# Patient Record
Sex: FEMALE | Race: WHITE | Employment: FULL TIME | URBAN - METROPOLITAN AREA
[De-identification: names, ages, dates, MRNs, and addresses within clinical notes are randomized per-mention and may not be internally consistent; named-entity substitution may affect disease eponyms.]

---

## 2021-05-30 ENCOUNTER — RECORDS - HEALTHEAST (OUTPATIENT)
Dept: ADMINISTRATIVE | Facility: CLINIC | Age: 61
End: 2021-05-30

## 2021-06-02 ENCOUNTER — RECORDS - HEALTHEAST (OUTPATIENT)
Dept: ADMINISTRATIVE | Facility: CLINIC | Age: 61
End: 2021-06-02

## 2021-07-21 ENCOUNTER — RECORDS - HEALTHEAST (OUTPATIENT)
Dept: ADMINISTRATIVE | Facility: CLINIC | Age: 61
End: 2021-07-21

## 2021-08-09 ENCOUNTER — DOCUMENTATION ONLY (OUTPATIENT)
Dept: TRANSPLANT | Facility: CLINIC | Age: 61
End: 2021-08-09

## 2021-08-10 ENCOUNTER — TELEPHONE (OUTPATIENT)
Dept: TRANSPLANT | Facility: CLINIC | Age: 61
End: 2021-08-10

## 2021-08-10 NOTE — TELEPHONE ENCOUNTER
Initial Independent Living Donor Advocate contact made with potential donor today.  I introduced myself and my role during the donation process, includin.  ROSEMARY ROLE   The federal government requires that all licensed transplant centers provide the living donor with an Independent Living Donor Advocate (ROSEMARY).  I do not meet recipients or attend meetings that discuss their care or decision to transplant them. My role is separate to avoid any conflict of interest.  My role is to ensure:  1) your rights are protected;  2) you get all the information you need from the transplant team to make a fully informed decision whether to donate;   3) that living donation is in your best interest.   4) that you have the right to decide NOT to go forward with living donation at any time during this process.  I am available to you throughout the workup, during surgery phase and follow-up at home.   2. WORKUP & PRIVACY     Your identity and workup are not shared with the recipient at any time.     There is a medical donor workup that consists of testing to determine if you are healthy enough to donate.  Workup tests include many blood draws, urine collection/ (kidney function testing), chest x-ray, EKG, CT scan. As you complete each step then you may move on to the next.  Workup can take as little or as long as you need and you can stop the process at any time.     Transplant is a treatment option, not a cure. A kidney from a living kidney donor can last 12-14 years.  Other treatment options are  donation and two types of dialysis.     This is major surgery and your estimated hospital stay is approximately 1-2 nights.  After surgery, there are driving and lifting restrictions - no driving for two weeks and no lifting over ten pounds for 6 - 8 weeks.  Donors are routinely off from work for 4 - 6 weeks after surgery, and potentially longer if they have a physical job.       If you anticipate lost wages due to donation,  donor wage reimbursement options may be available to you and will be reviewed with you during the evaluation process.      The recipient's insurance covers the medical expenses related to the donor evaluation and surgery.  However, it is important for you to carry your own health insurance to address any medical issues that are found and are NOT related to living donation.  3.  QUESTIONS  Have you received a packet from the transplant department?     Questions?    Have you discussed with anyone your potential decision to donate?   Yes.  Is anyone pressuring or coercing you to donate? No.  Have you discussed any financial arrangements with recipient around donating a kidney? No.  Are you aware that you can confidentially opt out at any time, up to and including day of donation? Yes.  At this time, would you like to proceed with the medical evaluation to see if you can be a kidney donor?  Yes.    If yes, the donor coordinator will be reaching out to you with next steps.     You can reach me or someone else on the ROSEMARY team by calling [phone] Option 3.    ROSEMARY NOTES: Maura wants to be a donor for a friend.  She is not sure about doing paired exchange. Scheduled with Crystal Egan RN on  Monday, August 16 at 1:00 pm phone, [phone]     Duration of call 20 minutes.

## 2021-08-16 ENCOUNTER — TELEPHONE (OUTPATIENT)
Dept: TRANSPLANT | Facility: CLINIC | Age: 61
End: 2021-08-16

## 2021-08-16 NOTE — TELEPHONE ENCOUNTER
Contacted Maura Sanders to introduce myself and my role, review of medical/surgical/family history and next steps.     Maura Sanders  is aware She can stop donor evaluation at any time.     Maura Sanders is a 61 year old female  ABO unknown that would like to learn more about donation to a friend.     Concerns from medical/surgical/family history: Needs to complete pap, mammo, and colonoscopy (due at 62 per Maura) prior to donation.  Hasn't had them in 5+ years.       Per our Phase 1 algorithm, does meet criteria to do preliminary testing.     Reviewed evaluation testing: Covid PCR, Iohexol, Lab work, CXR, EKG, Provider visits and functions, CT Angiogram.     Reviewed operations of selection committee and angio review meetings and the need for multidisciplinary input.     Reviewed NKR listing and transfer of care to [facility] team if approved. Provided Maura with NKR website to review.     Briefly went over options if approved of NDD, SVP and FVP.     Confirmed that Maura reviewed Informed consent document and all questions answered.  Reviewed that they will receive Docusign to obtain electronic signature for the following: Informed consent, SRTR data, BOBBY for medical information, Auth for Electronic communication and will need their signed consent back before proceeding with evaluation.      Encouraged sign up for MyChart and confirmed [facility] sign up.    Verified recipient status if not NDD.    Donor timeline: TBD     Will send orders to scheduling team to set up for phase 1 testing.

## 2021-08-18 ENCOUNTER — MEDICAL CORRESPONDENCE (OUTPATIENT)
Dept: HEALTH INFORMATION MANAGEMENT | Facility: CLINIC | Age: 61
End: 2021-08-18

## 2021-08-21 ENCOUNTER — HEALTH MAINTENANCE LETTER (OUTPATIENT)
Age: 61
End: 2021-08-21

## 2021-08-31 ENCOUNTER — LAB (OUTPATIENT)
Dept: LAB | Facility: CLINIC | Age: 61
End: 2021-08-31

## 2021-08-31 DIAGNOSIS — Z00.5 PHYSICAL EXAMINATION OF POTENTIAL ORGAN DONOR: Primary | ICD-10-CM

## 2021-08-31 LAB
ABO/RH(D): NORMAL
ALBUMIN UR-MCNC: NEGATIVE MG/DL
APPEARANCE UR: CLEAR
BILIRUB UR QL STRIP: NEGATIVE
COLOR UR AUTO: ABNORMAL
CREAT SERPL-MCNC: 0.88 MG/DL (ref 0.52–1.04)
CREAT UR-MCNC: 11 MG/DL
CREAT UR-MCNC: 11 MG/DL
GFR SERPL CREATININE-BSD FRML MDRD: 72 ML/MIN/1.73M2
GLUCOSE UR STRIP-MCNC: NEGATIVE MG/DL
HGB BLD-MCNC: 13 G/DL (ref 11.7–15.7)
HGB UR QL STRIP: NEGATIVE
KETONES UR STRIP-MCNC: NEGATIVE MG/DL
LEUKOCYTE ESTERASE UR QL STRIP: ABNORMAL
MICROALBUMIN UR-MCNC: <5 MG/L
MICROALBUMIN/CREAT UR: NORMAL MG/G{CREAT}
NITRATE UR QL: NEGATIVE
PH UR STRIP: 7 [PH] (ref 5–7)
PROT UR-MCNC: <0.05 G/L
PROT/CREAT 24H UR: NORMAL MG/G{CREAT}
RBC URINE: <1 /HPF
SP GR UR STRIP: 1 (ref 1–1.03)
SPECIMEN EXPIRATION DATE: NORMAL
SQUAMOUS EPITHELIAL: <1 /HPF
UROBILINOGEN UR STRIP-MCNC: NORMAL MG/DL
WBC URINE: 2 /HPF

## 2021-08-31 PROCEDURE — 36415 COLL VENOUS BLD VENIPUNCTURE: CPT

## 2021-08-31 PROCEDURE — 84156 ASSAY OF PROTEIN URINE: CPT

## 2021-08-31 PROCEDURE — 86900 BLOOD TYPING SEROLOGIC ABO: CPT

## 2021-08-31 PROCEDURE — 81001 URINALYSIS AUTO W/SCOPE: CPT

## 2021-08-31 PROCEDURE — 82565 ASSAY OF CREATININE: CPT

## 2021-08-31 PROCEDURE — 85018 HEMOGLOBIN: CPT

## 2021-08-31 PROCEDURE — 82043 UR ALBUMIN QUANTITATIVE: CPT

## 2021-09-02 ENCOUNTER — DOCUMENTATION ONLY (OUTPATIENT)
Dept: TRANSPLANT | Facility: CLINIC | Age: 61
End: 2021-09-02

## 2021-09-02 ENCOUNTER — TELEPHONE (OUTPATIENT)
Dept: TRANSPLANT | Facility: CLINIC | Age: 61
End: 2021-09-02

## 2021-09-02 DIAGNOSIS — Z00.5 TRANSPLANT DONOR EVALUATION: Primary | ICD-10-CM

## 2021-09-02 RX ORDER — METHYLPREDNISOLONE SODIUM SUCCINATE 125 MG/2ML
125 INJECTION, POWDER, LYOPHILIZED, FOR SOLUTION INTRAMUSCULAR; INTRAVENOUS
Status: CANCELLED
Start: 2021-09-10

## 2021-09-02 RX ORDER — ALBUTEROL SULFATE 90 UG/1
1-2 AEROSOL, METERED RESPIRATORY (INHALATION)
Status: CANCELLED
Start: 2021-09-10

## 2021-09-02 RX ORDER — NALOXONE HYDROCHLORIDE 0.4 MG/ML
0.2 INJECTION, SOLUTION INTRAMUSCULAR; INTRAVENOUS; SUBCUTANEOUS
Status: CANCELLED | OUTPATIENT
Start: 2021-09-10

## 2021-09-02 RX ORDER — EPINEPHRINE 1 MG/ML
0.3 INJECTION, SOLUTION, CONCENTRATE INTRAVENOUS EVERY 5 MIN PRN
Status: CANCELLED | OUTPATIENT
Start: 2021-09-10

## 2021-09-02 RX ORDER — ALBUTEROL SULFATE 0.83 MG/ML
2.5 SOLUTION RESPIRATORY (INHALATION)
Status: CANCELLED | OUTPATIENT
Start: 2021-09-10

## 2021-09-02 RX ORDER — MEPERIDINE HYDROCHLORIDE 25 MG/ML
25 INJECTION INTRAMUSCULAR; INTRAVENOUS; SUBCUTANEOUS EVERY 30 MIN PRN
Status: CANCELLED | OUTPATIENT
Start: 2021-09-10

## 2021-09-02 RX ORDER — DIPHENHYDRAMINE HYDROCHLORIDE 50 MG/ML
50 INJECTION INTRAMUSCULAR; INTRAVENOUS
Status: CANCELLED
Start: 2021-09-10

## 2021-09-02 NOTE — TELEPHONE ENCOUNTER
LM regarding phase 1 results.  Didn't see the 3 BP 15 mins apart and wanted to know if they completed those.      Maura called back at 1pm    Maura Sanders  is aware She can stop donor evaluation at any time.     Maura Sanders is a 61 year old female  ABO A that would like to learn more about donation to a friend.     Concerns from medical/surgical/family history: none    Reviewed any history of travel in endemic areas:   Strongyloides- [place] and [place].  Trypanosoma cruzi ([place])- [place]  West Nile Virus- [place], [place], [place], [place] and [place].     Reviewed preliminary lab tests.     Reviewed evaluation testing: Covid PCR, Iohexol, Lab work, CXR, EKG, Provider visits and functions, CT Angiogram.     Reviewed operations of selection committee and angio review meetings and the need for multidisciplinary input.     Reviewed NKR listing and transfer of care to [facility] team if approved. Provided Maura with NKR website to review.     Briefly went over options if approved of NDD, SVP and FVP.     Maura would like to proceed to evaluation on 9/10/21 if possible with Iohexol on 9/10/21 and COVID PCR prior to Iohexol.     Confirmed that Maura reviewed Informed consent document and all questions answered.  Reviewed that they will receive Docusign to obtain electronic signature for the following: Informed consent, SRTR data, BOBBY for medical information, Auth for Electronic communication and will need their signed consent back before proceeding with evaluation.      Encouraged sign up for MyChart and confirmed [facility] sign up.    Verified recipient status if not NDD.    Donor timeline: TBD     Will send orders to scheduling team to set up for evaluation testing.

## 2021-09-02 NOTE — PROGRESS NOTES
3 BP's didn't get into care everywhere from [facility] (phase 1's) from 8/25/21:    106/64  96/64  108/64

## 2021-09-02 NOTE — TELEPHONE ENCOUNTER
Please sched kidney donor eval on 9/10/21 slot 2.Needs to do Iohexol and eval labs at [facility] lab same day.Will do COVID test at outside lab.Has MyChart.I will do orders.

## 2021-09-03 DIAGNOSIS — Z00.5 TRANSPLANT DONOR EVALUATION: Primary | ICD-10-CM

## 2021-09-07 ENCOUNTER — LAB (OUTPATIENT)
Dept: LAB | Facility: CLINIC | Age: 61
End: 2021-09-07
Payer: COMMERCIAL

## 2021-09-07 DIAGNOSIS — Z00.5 TRANSPLANT DONOR EVALUATION: ICD-10-CM

## 2021-09-07 LAB — SARS-COV-2 RNA RESP QL NAA+PROBE: NEGATIVE

## 2021-09-07 PROCEDURE — U0005 INFEC AGEN DETEC AMPLI PROBE: HCPCS

## 2021-09-07 PROCEDURE — U0003 INFECTIOUS AGENT DETECTION BY NUCLEIC ACID (DNA OR RNA); SEVERE ACUTE RESPIRATORY SYNDROME CORONAVIRUS 2 (SARS-COV-2) (CORONAVIRUS DISEASE [COVID-19]), AMPLIFIED PROBE TECHNIQUE, MAKING USE OF HIGH THROUGHPUT TECHNOLOGIES AS DESCRIBED BY CMS-2020-01-R: HCPCS

## 2021-09-09 LAB
A1 AG RBC QL: POSITIVE
ABO/RH(D): NORMAL
SPECIMEN EXPIRATION DATE: NORMAL
SPECIMEN EXPIRATION DATE: NORMAL

## 2021-09-10 ENCOUNTER — APPOINTMENT (OUTPATIENT)
Dept: TRANSPLANT | Facility: CLINIC | Age: 61
End: 2021-09-10
Attending: SURGERY

## 2021-09-10 ENCOUNTER — OFFICE VISIT (OUTPATIENT)
Dept: NEPHROLOGY | Facility: CLINIC | Age: 61
End: 2021-09-10
Attending: SURGERY

## 2021-09-10 ENCOUNTER — ALLIED HEALTH/NURSE VISIT (OUTPATIENT)
Dept: TRANSPLANT | Facility: CLINIC | Age: 61
End: 2021-09-10
Attending: SURGERY

## 2021-09-10 ENCOUNTER — OFFICE VISIT (OUTPATIENT)
Dept: INFUSION THERAPY | Facility: CLINIC | Age: 61
End: 2021-09-10
Attending: INTERNAL MEDICINE

## 2021-09-10 ENCOUNTER — OFFICE VISIT (OUTPATIENT)
Dept: TRANSPLANT | Facility: CLINIC | Age: 61
End: 2021-09-10
Attending: SURGERY

## 2021-09-10 ENCOUNTER — DOCUMENTATION ONLY (OUTPATIENT)
Dept: TRANSPLANT | Facility: CLINIC | Age: 61
End: 2021-09-10

## 2021-09-10 ENCOUNTER — LAB (OUTPATIENT)
Dept: LAB | Facility: CLINIC | Age: 61
End: 2021-09-10
Attending: INTERNAL MEDICINE

## 2021-09-10 VITALS
OXYGEN SATURATION: 98 % | RESPIRATION RATE: 12 BRPM | SYSTOLIC BLOOD PRESSURE: 120 MMHG | DIASTOLIC BLOOD PRESSURE: 71 MMHG | WEIGHT: 128.4 LBS | HEIGHT: 65 IN | TEMPERATURE: 97.1 F | HEART RATE: 68 BPM | BODY MASS INDEX: 21.39 KG/M2

## 2021-09-10 VITALS
SYSTOLIC BLOOD PRESSURE: 96 MMHG | WEIGHT: 128.31 LBS | BODY MASS INDEX: 21.38 KG/M2 | HEART RATE: 77 BPM | DIASTOLIC BLOOD PRESSURE: 66 MMHG | OXYGEN SATURATION: 100 % | HEIGHT: 65 IN

## 2021-09-10 DIAGNOSIS — Z00.5 TRANSPLANT DONOR EVALUATION: Primary | ICD-10-CM

## 2021-09-10 DIAGNOSIS — E78.2 MIXED HYPERLIPIDEMIA: ICD-10-CM

## 2021-09-10 LAB
ABO/RH(D): NORMAL
ALBUMIN SERPL-MCNC: 3.7 G/DL (ref 3.4–5)
ALBUMIN UR-MCNC: NEGATIVE MG/DL
ALP SERPL-CCNC: 63 U/L (ref 40–150)
ALT SERPL W P-5'-P-CCNC: 25 U/L (ref 0–50)
ANION GAP SERPL CALCULATED.3IONS-SCNC: 8 MMOL/L (ref 3–14)
ANTIBODY SCREEN: NEGATIVE
APPEARANCE UR: CLEAR
APTT PPP: 33 SECONDS (ref 22–38)
AST SERPL W P-5'-P-CCNC: 17 U/L (ref 0–45)
BILIRUB DIRECT SERPL-MCNC: 0.1 MG/DL (ref 0–0.2)
BILIRUB SERPL-MCNC: 0.4 MG/DL (ref 0.2–1.3)
BILIRUB UR QL STRIP: NEGATIVE
BUN SERPL-MCNC: 17 MG/DL (ref 7–30)
CALCIUM SERPL-MCNC: 9 MG/DL (ref 8.5–10.1)
CHLORIDE BLD-SCNC: 108 MMOL/L (ref 94–109)
CHOLEST SERPL-MCNC: 232 MG/DL
CMV IGG SERPL IA-ACNC: >10 U/ML
CMV IGG SERPL IA-ACNC: ABNORMAL
CO2 SERPL-SCNC: 27 MMOL/L (ref 20–32)
COLOR UR AUTO: NORMAL
CREAT SERPL-MCNC: 1.05 MG/DL (ref 0.52–1.04)
CREAT UR-MCNC: 61 MG/DL
CREAT UR-MCNC: 61 MG/DL
EBV VCA IGG SER IA-ACNC: >750 U/ML
EBV VCA IGG SER IA-ACNC: POSITIVE
EBV VCA IGM SER IA-ACNC: <10 U/ML
EBV VCA IGM SER IA-ACNC: NORMAL
ERYTHROCYTE [DISTWIDTH] IN BLOOD BY AUTOMATED COUNT: 13.1 % (ref 10–15)
FASTING STATUS PATIENT QL REPORTED: YES
GFR SERPL CREATININE-BSD FRML MDRD: 58 ML/MIN/1.73M2
GLUCOSE BLD-MCNC: 88 MG/DL (ref 70–99)
GLUCOSE UR STRIP-MCNC: NEGATIVE MG/DL
HBA1C MFR BLD: 5.3 % (ref 0–5.6)
HBV CORE AB SERPL QL IA: NONREACTIVE
HBV SURFACE AB SERPL IA-ACNC: 8.72 M[IU]/ML
HBV SURFACE AG SERPL QL IA: NONREACTIVE
HCT VFR BLD AUTO: 39.9 % (ref 35–47)
HCV AB SERPL QL IA: NONREACTIVE
HDLC SERPL-MCNC: 76 MG/DL
HGB BLD-MCNC: 12.7 G/DL (ref 11.7–15.7)
HGB UR QL STRIP: NEGATIVE
HIV 1+2 AB+HIV1 P24 AG SERPL QL IA: NONREACTIVE
INR PPP: 0.98 (ref 0.85–1.15)
KETONES UR STRIP-MCNC: NEGATIVE MG/DL
LDLC SERPL CALC-MCNC: 144 MG/DL
LEUKOCYTE ESTERASE UR QL STRIP: NEGATIVE
MCH RBC QN AUTO: 30.2 PG (ref 26.5–33)
MCHC RBC AUTO-ENTMCNC: 31.8 G/DL (ref 31.5–36.5)
MCV RBC AUTO: 95 FL (ref 78–100)
MICROALBUMIN UR-MCNC: <5 MG/L
MICROALBUMIN/CREAT UR: NORMAL MG/G{CREAT}
NITRATE UR QL: NEGATIVE
NONHDLC SERPL-MCNC: 156 MG/DL
PH UR STRIP: 6 [PH] (ref 5–7)
PHOSPHATE SERPL-MCNC: 3.9 MG/DL (ref 2.5–4.5)
PLATELET # BLD AUTO: 281 10E3/UL (ref 150–450)
POTASSIUM BLD-SCNC: 4 MMOL/L (ref 3.4–5.3)
PROT SERPL-MCNC: 7.3 G/DL (ref 6.8–8.8)
PROT UR-MCNC: <0.05 G/L
PROT/CREAT 24H UR: NORMAL MG/G{CREAT}
RBC # BLD AUTO: 4.2 10E6/UL (ref 3.8–5.2)
RBC URINE: <1 /HPF
SODIUM SERPL-SCNC: 143 MMOL/L (ref 133–144)
SP GR UR STRIP: 1.01 (ref 1–1.03)
SPECIMEN EXPIRATION DATE: NORMAL
T PALLIDUM AB SER QL: NONREACTIVE
TRIGL SERPL-MCNC: 61 MG/DL
URATE SERPL-MCNC: 4.6 MG/DL (ref 2.6–6)
UROBILINOGEN UR STRIP-MCNC: NORMAL MG/DL
WBC # BLD AUTO: 4.1 10E3/UL (ref 4–11)
WBC URINE: 1 /HPF

## 2021-09-10 PROCEDURE — 250N000011 HC RX IP 250 OP 636: Performed by: INTERNAL MEDICINE

## 2021-09-10 PROCEDURE — 83036 HEMOGLOBIN GLYCOSYLATED A1C: CPT

## 2021-09-10 PROCEDURE — 86644 CMV ANTIBODY: CPT

## 2021-09-10 PROCEDURE — 82542 COL CHROMOTOGRAPHY QUAL/QUAN: CPT | Performed by: SURGERY

## 2021-09-10 PROCEDURE — 99204 OFFICE O/P NEW MOD 45 MIN: CPT | Performed by: SURGERY

## 2021-09-10 PROCEDURE — 87340 HEPATITIS B SURFACE AG IA: CPT

## 2021-09-10 PROCEDURE — 84100 ASSAY OF PHOSPHORUS: CPT

## 2021-09-10 PROCEDURE — 84156 ASSAY OF PROTEIN URINE: CPT

## 2021-09-10 PROCEDURE — 85610 PROTHROMBIN TIME: CPT

## 2021-09-10 PROCEDURE — G0463 HOSPITAL OUTPT CLINIC VISIT: HCPCS

## 2021-09-10 PROCEDURE — 36415 COLL VENOUS BLD VENIPUNCTURE: CPT | Performed by: SURGERY

## 2021-09-10 PROCEDURE — 80053 COMPREHEN METABOLIC PANEL: CPT

## 2021-09-10 PROCEDURE — 36415 COLL VENOUS BLD VENIPUNCTURE: CPT

## 2021-09-10 PROCEDURE — 86901 BLOOD TYPING SEROLOGIC RH(D): CPT

## 2021-09-10 PROCEDURE — 99204 OFFICE O/P NEW MOD 45 MIN: CPT

## 2021-09-10 PROCEDURE — 81001 URINALYSIS AUTO W/SCOPE: CPT

## 2021-09-10 PROCEDURE — 86905 BLOOD TYPING RBC ANTIGENS: CPT

## 2021-09-10 PROCEDURE — 84550 ASSAY OF BLOOD/URIC ACID: CPT

## 2021-09-10 PROCEDURE — 86481 TB AG RESPONSE T-CELL SUSP: CPT

## 2021-09-10 PROCEDURE — 85730 THROMBOPLASTIN TIME PARTIAL: CPT

## 2021-09-10 PROCEDURE — 86803 HEPATITIS C AB TEST: CPT

## 2021-09-10 PROCEDURE — 80061 LIPID PANEL: CPT

## 2021-09-10 PROCEDURE — 86900 BLOOD TYPING SEROLOGIC ABO: CPT

## 2021-09-10 PROCEDURE — 82043 UR ALBUMIN QUANTITATIVE: CPT

## 2021-09-10 PROCEDURE — 86665 EPSTEIN-BARR CAPSID VCA: CPT

## 2021-09-10 PROCEDURE — 86704 HEP B CORE ANTIBODY TOTAL: CPT

## 2021-09-10 PROCEDURE — 82248 BILIRUBIN DIRECT: CPT

## 2021-09-10 PROCEDURE — 86780 TREPONEMA PALLIDUM: CPT

## 2021-09-10 PROCEDURE — 86706 HEP B SURFACE ANTIBODY: CPT

## 2021-09-10 PROCEDURE — 85027 COMPLETE CBC AUTOMATED: CPT

## 2021-09-10 RX ORDER — NALOXONE HYDROCHLORIDE 0.4 MG/ML
0.2 INJECTION, SOLUTION INTRAMUSCULAR; INTRAVENOUS; SUBCUTANEOUS
Status: CANCELLED | OUTPATIENT
Start: 2021-09-10

## 2021-09-10 RX ORDER — METHYLPREDNISOLONE SODIUM SUCCINATE 125 MG/2ML
125 INJECTION, POWDER, LYOPHILIZED, FOR SOLUTION INTRAMUSCULAR; INTRAVENOUS
Status: CANCELLED
Start: 2021-09-10

## 2021-09-10 RX ORDER — EPINEPHRINE 1 MG/ML
0.3 INJECTION, SOLUTION INTRAMUSCULAR; SUBCUTANEOUS EVERY 5 MIN PRN
Status: CANCELLED | OUTPATIENT
Start: 2021-09-10

## 2021-09-10 RX ORDER — MEPERIDINE HYDROCHLORIDE 25 MG/ML
25 INJECTION INTRAMUSCULAR; INTRAVENOUS; SUBCUTANEOUS EVERY 30 MIN PRN
Status: CANCELLED | OUTPATIENT
Start: 2021-09-10

## 2021-09-10 RX ORDER — DIPHENHYDRAMINE HYDROCHLORIDE 50 MG/ML
50 INJECTION INTRAMUSCULAR; INTRAVENOUS
Status: CANCELLED
Start: 2021-09-10

## 2021-09-10 RX ORDER — ALBUTEROL SULFATE 0.83 MG/ML
2.5 SOLUTION RESPIRATORY (INHALATION)
Status: CANCELLED | OUTPATIENT
Start: 2021-09-10

## 2021-09-10 RX ORDER — ALBUTEROL SULFATE 90 UG/1
1-2 AEROSOL, METERED RESPIRATORY (INHALATION)
Status: CANCELLED
Start: 2021-09-10

## 2021-09-10 RX ADMIN — IOHEXOL 5 ML: 300 INJECTION, SOLUTION INTRAVENOUS at 07:58

## 2021-09-10 ASSESSMENT — MIFFLIN-ST. JEOR
SCORE: 1158.3
SCORE: 1157.88

## 2021-09-10 ASSESSMENT — PAIN SCALES - GENERAL: PAINLEVEL: NO PAIN (0)

## 2021-09-10 NOTE — PATIENT INSTRUCTIONS
Thank you for your willingness to donate!     We are awaiting your test to assess your kidney function. Subsequent testing will be guided by the results of this test.   Your coordinator will be reaching out to you once we know more regarding this test.

## 2021-09-10 NOTE — NURSING NOTE
Saw Maura in clinic on 9/10/21 for Living Kidney Donor Evaluation.    Maura is interested in donation for a friend.    I provided a folder which included copies of the following:    Living Kidney Donor Evaluation Consent  Paired Exchange Consent  Donor Shield Pamphlet  Living Donor Collective Study information  Kidney for Life pamphlet  Kidney Donors are Heroes! Study synopsis  SRTR Data from 1/11/21.      I also provided a parking pass.    I reviewed the Living Kidney Donor Evaluation Consent, dated 7-6-2020 and Paired Exchange/ NDD consent dated 9-.  I answered any question.    Evaluation Notes:  1. eGFR was 58 and creat was 1.05.  Kidney function has decreased from her phase 1 labs.  Will continue with iohexol and review results at committee. Per nephrology will cancel CXR, EKG, and CT scan for now until iohexol results come back.  2. Internal tissue typing collected and per Maura she sent in the NKR 3g tissue typing 2-3 week ago.

## 2021-09-10 NOTE — LETTER
9/10/2021       RE: Maura Sanders  [address]  [place] [place] [address]     Dear Colleague,    Thank you for referring your patient, Maura Sanders, to the [facility] [place] at [facility]. Please see a copy of my visit note below.          TRANSPLANT NEPHROLOGY DONOR EVALUATION    Assessment and Plan:  # Prospective Kidney Transplant Donor: Patient with one main issue that needs to be addressed prior to donation.     Within the limitations of both estimated and measured GFR assessments, it remains unclear if she has adequate renal reserve for kidney donation. Accounting for underestimation with her best eGFR on 8/31/2021, she is borderline. We will have to see what her measured GFR shows to determine her candidacy for donation and guide subsequent testing.     She otherwise appears to be an excellent candidate with high motivation, overall great health, adequate support, and excellent insight and judgement.      Patient's blood pressure is acceptable at this visit, kidney function appears to be possibly low with Iohexol pending, and urinalysis is bland.    Discussed the risks of donating a kidney, including the surgical risk and the possible risks of living with one kidney.    Education about expected post-donation kidney function and how chronic kidney disease (CKD) and end stage kidney disease (ESKD) might potentially impact the donor in the future, include, but not limited to:       - On average, donors will have 25-35% permanent loss of kidney function at donation.       - Baseline risk of ESKD may slightly exceed that of members of the general population with the same demographic profile.       - Donor risks must be interpreted in light of known epidemiology of both CKD or ESKD, such as that CKD generally develops in midlife (40-50 years old) and ESKD generally develops after age 60.       - Medical evaluation of young potential  donors cannot predict lifetime risk of CKD or ESKD.       - Donors may be at higher risk for CKD if they sustain damage to the remaining kidney.       - Development of CKD and progression of ESKD may be more rapid with only 1 kidney. I described her risk for ESRD within the limitations of our current understanding using Deyanira et al. Melissa SHERMAN, Jase AB, Yunior MC, Blu RA, Dane MA, Mg JL, Carmen DL. Risk of end-stage renal disease following live kidney donation. KING. 2014 Feb 12;311(6):579-86. doi: 10.1001/king.2013.386013. PMID: 19226587; PMCID: [id number].       - Some type of kidney replacement therapy, either kidney transplant or dialysis, is required when reaching ESKD.    Potential medical or surgical risks include, but not limited to:       - Death.       - Scars, pain, fatigue, and other consequences typical of any surgical procedure.       - Decreased kidney function.       - Abdominal or bowel symptoms, such as bloating and nausea, and developing bowel obstruction.       - Kidney failure (ESKD) and the need for a kidney transplant or dialysis for the donor.       - Impact of obesity, hypertension, or other donor-specific medical conditions on morbidity and mortality of the potential donor.    Patients overall evaluation will be discussed with the transplant team in committee and a final recommendation on the patients' suitability to be a kidney transplant donor will be made at that time.    #Mixed hyperlipidemia on fasting labs   -I advised she discuss with her primary care physician about diet, lifestyle changes and possibly medication as deemed appropriate.    Consult:  Maura Sanders was seen in consultation at the request of Dr. Yared Wilson for evaluation as a potential kidney transplant donor.    Reason for Visit:  Maura Sanders is a 61 year old female who presents for a kidney donor evaluation.  Patient would like to donate to her neighbor, but would be interested in  "non-directed donation/paired exchange were they not a match.     HPI:    Ms. Sanders is a 61 year old female with a history of anemia, vitamin D deficiency, 3 uncomplicated pregnancies with C-sections, appendectomy who is here for a donor evaluation.     Overall Ms. Sanders has been doing well, denying any major health concerns today. She is not on any prescription mediations. She takes a Vitamin D supplement, and iron, magnesium supplements. She uses Aleve \"once every 2 months\" for aches, pains. She is physically fit with a BMI of 21.     She has some familiarity with life after solitary kidney due to her  losing his kidney due to renal cell carcinoma.     On review of systems, she denies fevers, chills, chest pain, shortness of breath, nausea, vomiting, abdominal pain, diarrhea, constipation, edema.     ABO: A negative     Kidney Disease Hx:  eGFR range between 58 and 72 per our labs. Iohexol pending.   UA bland, no overt proteinuria          h/o Kidney Problems: No  Family h/o Genetic Kidney Disease: No       h/o HTN: No    Usual BP: high 90s-100s/60s       h/o Protein in Urine: No  h/o Blood in Urine: No       h/o Kidney Stones: No  h/o Kidney Injury: No       h/o Recurrent UTI: No  h/o Genitourinary Problems: No       h/o Chronic NSAID Use: No    Diabetes  Hx:       h/o DM: No    Cardiovascular Hx:   -Hyperlipidemia with elevated LDL, non-HDL.                h/o Gastrointestinal, Pancreas or Liver Problems: No       h/o Lung or Heart Problems: No       h/o Hematologic Problems: No  h/o Bleeding or Clotting Problems: No       h/o Cancer: No       h/o Infection Problems: No       h/o Gestational DM: No      h/o Preeclampsia: No         Skin Cancer Risk:       h/o more than 50 moles: Yes ; has had a couple spots removed. Pre-cancerous, but denies any cancerous lesions.        h/o extensive sun exposure: Yes        h/o melanoma: No       Family h/o melanoma: No          Cancer Risk:        No personal hx of " cancer        Last Pap: 5 years ago        Last mammogram: 5 years ago        Last colonoscopy: 9 years ago          Mental Health Assessment:       h/o Depression: No       h/o Psychiatric Illness: No       h/o Suicidal Attempt(s): No          COVID Vaccination:        She received Pfizer vaccine 8/2021    Review Of Systems:   A comprehensive review of systems was obtained and negative, except as noted in the HPI or PMH.    Past Medical History:   History was taken from the patient as noted below.  Past Medical History:   Diagnosis Date     Anemia      Vitamin D deficiency        Past Social History:   Past Surgical History:   Procedure Laterality Date     APPENDECTOMY N/A 1974     BUNIONECTOMY N/A 2015     C/SECTION, CLASSICAL N/A 1985     C/SECTION, CLASSICAL  1987     C/SECTION, CLASSICAL N/A 1991     Personal or family history of anesthesia problems: No    Family History:   Family History   Problem Relation Age of Onset     Heart Surgery Father      Cerebrovascular Disease Father      Miguel syndrome Sister      No Known Problems Sister      No Known Problems Brother      No Known Problems Brother      Cerebrovascular Disease Maternal Grandmother      Colon Polyps Maternal Grandfather      Breast Cancer Paternal Grandmother      Pancreatic Cancer Paternal Grandfather      No Known Problems Son      No Known Problems Son      Down Syndrome Daughter           Specific Family History in First Degree Relatives:       FH of Kidney Dz: No FH of DM: No       FH of HTN: No  FH of CAD: No       FH of Cancer: Yes   FH of Kidney Cancer: No    Personal History:   Social History     Socioeconomic History     Marital status:      Spouse name: Not on file     Number of children: Not on file     Years of education: Not on file     Highest education level: Not on file   Occupational History     Not on file   Tobacco Use     Smoking status: Never Smoker     Smokeless tobacco: Never Used   Substance and Sexual Activity      Alcohol use: Not Currently     Drug use: Never     Sexual activity: Not on file   Other Topics Concern     Not on file   Social History Narrative     Not on file     Social Determinants of Health     Financial Resource Strain:      Difficulty of Paying Living Expenses:    Food Insecurity:      Worried About Running Out of Food in the Last Year:      Ran Out of Food in the Last Year:    Transportation Needs:      Lack of Transportation (Medical):      Lack of Transportation (Non-Medical):    Physical Activity:      Days of Exercise per Week:      Minutes of Exercise per Session:    Stress:      Feeling of Stress :    Social Connections:      Frequency of Communication with Friends and Family:      Frequency of Social Gatherings with Friends and Family:      Attends [employer]ats[employer] Services:      Active Member of Clubs or Organizations:      Attends Club or Organization Meetings:      Marital Status:    Intimate Partner Violence:      Fear of Current or Ex-Partner:      Emotionally Abused:      Physically Abused:      Sexually Abused:           Specific Social History:  Active. Biking, tennis, walking, pickleball.   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          Health Insurance Status: Yes       Employment Status: Unemployed  Occupation: Previously worked as physical therapy assistant                     Living Arrangements: lives with their spouse and daughter        Social Support: Yes       Presence of increased risk for disease transmission behaviors as defined by PHS guidelines: No tobacco history. Not current alcohol use.         Allergies:  No Known Allergies    Medications:  No current outpatient medications on file.     No current facility-administered medications for this visit.     There are no discontinued medications.      Vitals:  Vital Signs 9/10/2021 9/10/2021 9/10/2021   Systolic 99 95 96   Diastolic 66 69 66   Pulse - - -   Temperature - - -   Respirations - - -    Weight (LB) - - -   Height - - -   BMI (Calculated) - - -   Pain - - -   O2 - - -       Exam:   GENERAL APPEARANCE: alert and no distress. Appears younger than stated age.   HENT: mouth without ulcers or lesions  LYMPHATICS: no cervical or supraclavicular nodes  RESP: lungs clear to auscultation - no rales, rhonchi or wheezes  CV: regular rhythm, normal rate, no rub, no murmur. 2+ radial and distal pulses.   EDEMA: no LE edema bilaterally  ABDOMEN: soft, nondistended, nontender, bowel sounds normal  MS: extremities normal - no gross deformities noted, no evidence of inflammation in joints, no muscle tenderness  SKIN: no rash    Results:   Labs and imaging were ordered for this visit and reviewed by me.  Recent Results (from the past 336 hour(s))   Albumin Random Urine Quantitative with Creat Ratio    Collection Time: 08/31/21  1:03 PM   Result Value Ref Range    Creatinine Urine mg/dL 11 mg/dL    Albumin Urine mg/L <5 mg/L    Albumin Urine mg/g Cr     Protein  random urine with Creat Ratio    Collection Time: 08/31/21  1:03 PM   Result Value Ref Range    Total Protein Random Urine g/L <0.05 g/L    Total Protein Urine g/gr Creatinine      Creatinine Urine mg/dL 11 mg/dL   UA with Microscopic    Collection Time: 08/31/21  1:03 PM   Result Value Ref Range    Color Urine Straw Colorless, Straw, Light Yellow, Yellow    Appearance Urine Clear Clear    Glucose Urine Negative Negative mg/dL    Bilirubin Urine Negative Negative    Ketones Urine Negative Negative mg/dL    Specific Gravity Urine 1.002 (L) 1.003 - 1.035    Blood Urine Negative Negative    pH Urine 7.0 5.0 - 7.0    Protein Albumin Urine Negative Negative mg/dL    Urobilinogen Urine Normal Normal, 2.0 mg/dL    Nitrite Urine Negative Negative    Leukocyte Esterase Urine Moderate (A) Negative    RBC Urine <1 <=2 /HPF    WBC Urine 2 <=5 /HPF    Squamous Epithelials Urine <1 <=1 /HPF   Creatinine    Collection Time: 08/31/21  1:37 PM   Result Value Ref Range     Creatinine 0.88 0.52 - 1.04 mg/dL    GFR Estimate 72 >60 mL/min/1.73m2   Hemoglobin    Collection Time: 08/31/21  1:37 PM   Result Value Ref Range    Hemoglobin 13.0 11.7 - 15.7 g/dL   ABO and Rh    Collection Time: 08/31/21  1:37 PM   Result Value Ref Range    ABO/RH(D) A NEG     SPECIMEN EXPIRATION DATE [id number]    Asymptomatic COVID-19 Virus (Coronavirus) by PCR Nose    Collection Time: 09/07/21  8:10 AM    Specimen: Nose; Swab   Result Value Ref Range    SARS CoV2 PCR Negative Negative   Routine UA with microscopic    Collection Time: 09/10/21  7:34 AM   Result Value Ref Range    Color Urine Straw Colorless, Straw, Light Yellow, Yellow    Appearance Urine Clear Clear    Glucose Urine Negative Negative mg/dL    Bilirubin Urine Negative Negative    Ketones Urine Negative Negative mg/dL    Specific Gravity Urine 1.011 1.003 - 1.035    Blood Urine Negative Negative    pH Urine 6.0 5.0 - 7.0    Protein Albumin Urine Negative Negative mg/dL    Urobilinogen Urine Normal Normal, 2.0 mg/dL    Nitrite Urine Negative Negative    Leukocyte Esterase Urine Negative Negative    RBC Urine <1 <=2 /HPF    WBC Urine 1 <=5 /HPF   Protein  random urine with Creat Ratio    Collection Time: 09/10/21  7:34 AM   Result Value Ref Range    Total Protein Random Urine g/L <0.05 g/L    Total Protein Urine g/gr Creatinine      Creatinine Urine mg/dL 61 mg/dL   Albumin Random Urine Quantitative with Creat Ratio    Collection Time: 09/10/21  7:34 AM   Result Value Ref Range    Creatinine Urine mg/dL 61 mg/dL    Albumin Urine mg/L <5 mg/L    Albumin Urine mg/g Cr     CBC with platelets    Collection Time: 09/10/21  7:34 AM   Result Value Ref Range    WBC Count 4.1 4.0 - 11.0 10e3/uL    RBC Count 4.20 3.80 - 5.20 10e6/uL    Hemoglobin 12.7 11.7 - 15.7 g/dL    Hematocrit 39.9 35.0 - 47.0 %    MCV 95 78 - 100 fL    MCH 30.2 26.5 - 33.0 pg    MCHC 31.8 31.5 - 36.5 g/dL    RDW 13.1 10.0 - 15.0 %    Platelet Count 281 150 - 450 10e3/uL   Partial  thromboplastin time    Collection Time: 09/10/21  7:34 AM   Result Value Ref Range    aPTT 33 22 - 38 Seconds   INR    Collection Time: 09/10/21  7:34 AM   Result Value Ref Range    INR 0.98 0.85 - 1.15   Hemoglobin A1c    Collection Time: 09/10/21  7:34 AM   Result Value Ref Range    Hemoglobin A1C 5.3 0.0 - 5.6 %   Phosphorus    Collection Time: 09/10/21  7:34 AM   Result Value Ref Range    Phosphorus 3.9 2.5 - 4.5 mg/dL   Uric acid    Collection Time: 09/10/21  7:34 AM   Result Value Ref Range    Uric Acid 4.6 2.6 - 6.0 mg/dL   Lipid Profile    Collection Time: 09/10/21  7:34 AM   Result Value Ref Range    Cholesterol 232 (H) <200 mg/dL    Triglycerides 61 <150 mg/dL    Direct Measure HDL 76 >=50 mg/dL    LDL Cholesterol Calculated 144 (H) <=100 mg/dL    Non HDL Cholesterol 156 (H) <130 mg/dL    Patient Fasting > 8hrs? Yes    Comprehensive metabolic panel    Collection Time: 09/10/21  7:34 AM   Result Value Ref Range    Sodium 143 133 - 144 mmol/L    Potassium 4.0 3.4 - 5.3 mmol/L    Chloride 108 94 - 109 mmol/L    Carbon Dioxide (CO2) 27 20 - 32 mmol/L    Anion Gap 8 3 - 14 mmol/L    Urea Nitrogen 17 7 - 30 mg/dL    Creatinine 1.05 (H) 0.52 - 1.04 mg/dL    Calcium 9.0 8.5 - 10.1 mg/dL    Glucose 88 70 - 99 mg/dL    Alkaline Phosphatase 63 40 - 150 U/L    AST 17 0 - 45 U/L    ALT 25 0 - 50 U/L    Protein Total 7.3 6.8 - 8.8 g/dL    Albumin 3.7 3.4 - 5.0 g/dL    Bilirubin Total 0.4 0.2 - 1.3 mg/dL    GFR Estimate 58 (L) >60 mL/min/1.73m2   Bilirubin direct    Collection Time: 09/10/21  7:34 AM   Result Value Ref Range    Bilirubin Direct 0.1 0.0 - 0.2 mg/dL               Again, thank you for allowing me to participate in the care of your patient.      Sincerely,     Kidney Donor Rodrigue

## 2021-09-10 NOTE — PROGRESS NOTES
Psychosocial Evaluation  Living Organ Donation per OPTN Policy 14.1.A  Organ Type: unrelated, kidney  Presenting Information:  Ms. Lorenzo sper presents to the [facility] to complete a psychosocial evaluation since she is interested in becoming a kidney donor for her next door neighbor, Mr. Lucas Wagner, age 58.    PERSONAL BACKGROUND:  Current Living Situation: Maura lives with her  and 30 year old daughter who has Down's Syndrome in a single family home in [place], [place].    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  She worked in that field for a number of years.  They she cleaned houses for a number of years.  She now cares for her adult daughter and also works in the home.  Her  is retired and works part time at [employer].  He was a technician for [employer].  She denies any financial concerns created to kidney donation.    Health Insurance Status: Ms. Sanders is covered by her 's health care insurance    Family History: Ms. Sanders is .  She and her  have been  for over 35 years.  Their oldest is a son, age 35 who lives with his fiance, another son, 34  lives in [place], [place] with his finance and their 2 girls, ages 15 months and 2 months.  And then Maura has a 30 year old daughter with downs syndrome who lives with them.      General Health: Ms. Sanders reports that she is in excellent health.  She does not have a living will at this time.    Mental Health: Denies any past or present treatment for mental health issues.  Denies any need to see a counselor or therapist.  Denies any past suicidal ideation, plans, or past attempts.  Denies any use of psychotropic medications.  Denies any past history of hospitalization for psychiatric illness.    Alcohol and Drug Use/Abuse/Dependency: Denies any past history of abuse or  dependency on alcohol or illicit drugs. Denies any current use of street drugs, including marijuana.  Denies any past history of negative consequences of use of alcohol or drugs such as a DUI, relationship problems, or problems with work or home life.   Currently, no alcohol use.  Used to drink about 1 per week, typically wine.    Cigarette Use: non smoker    Legal: no legal issues    Coping with major surgery/associated stress: Maura is fairly active.  She loves to read, travel, bike, cook.      Support System: Maura is very supportive and will be her caregiver.  Her sons and daughter are very supportive of her desire to be a donor.    DONOR SPECIFIC INFORMATION:  Relationship to Recipient: Ms. Sanders wants to donate a kidney to her next door neighbor, Mr. Lucas Wagner, age 58,    Decision Process/Motivation to Donate: Maura reports that helping others is a big part of her personal ethics.  She states that she was planning to buy the house next door to them and turn it into a group home for their daughter and a few other people with Down's Syndrome, but that did not work out.  Her neighbor, Lucas, moved in with his family and they have become close.  She wants to help him live a long, better quality of life for him and his family.  She denies feeling any pressure or coercion to be a donor.  She denies any offer of payment to be a kidney donor.    PREPARATION FOR DONATION, RECOVERY, AND POTENTIAL SHORT-LONG-TERM OUTCOMES:  Understanding of the Living Donation Process:   We discussed the role of the donor  and Independent Donor Advocate.  Short and long term medical and psychosocial risks to both, donor and recipient were reviewed and she voices her understanding of these risks.  High risk behaviors as defined by [place] Public Health Services (PHS) that have potential to increase risk of disease transmission were reviewed and she denies any high risk behaviors. Post surgical restrictions (2 weeks no  driving, 6 weeks no lifting over 10 lbs) were reviewed and she appears capable of adhering to the post surgical requirements. The need for a caregiver was discussed and her  will be her caregiver .  The risk of poor psychosocial outcome including problems with body image, post-surgery depression or anxiety, or feelings of emotional distress or bereavement if recipient experiences any recurrent disease, poor outcome or death was reviewed.  Additionally, potential financial implications, including the risk of having difficulty obtaining health care insurance, life insurance, disability insurance, or long term care insurance were reviewed, as were available donor grants to assist with donor related expenses.      We also discussed some unique issues that arise with paired kidney donation, which include the uncertainty of the timing and the importance of having a employment situation and support system that is able to provide sustained support and flexibility.    Ms. Sanders appears capable of understanding this information and making an informed medical decision.    Impressions/Recommendations:   Ms. Maura Sanders  is highly motivated to donate a kidney  to her neighbor, Mr. Lucas Wagner, age 58.  Her decision to donate is free of inducement, coercion, or other undue pressure.   Her housing, finances and employment are stable.  No current/active mental health or chemical abuse issues were identified.  The need for a caregiver was reviewed and she is able to identify a plan to meet her post operative care needs.  She appears capable of making an informed medical decision.  No psychosocial contraindications to living organ donation were identified and  I support Ms. Sanders s desire to donate a kidney to her neighbor, Mr. Lucas Wagner, age 58.         Contact Information:   RONN Lu, [facility]  Clinical  and Independent Donor Advocate  [facility]  Pager:  [phone]  Direct:  [phone]    Time Spent: 60 minutes      Living Kidney Donor Consent per OPTN Policy 14.3.A for Independent Living Donor Advocate (ROSEMARY)    Written assurance has been obtained from the potential donor that he/she:   Is willing to donate  Is free from inducement and coercion  Has been informed that the he/she may decline to donate at any time  Has been informed that transplant centers must:   A) Offer donors an opportunity to discontinue the donor consent or evaluation process in a way that is protected and confidential  B) Provide an independent living donor advocate (ROSEMARY) to assist the potential donor during this process    The following was presented to the potential donor in a language in which the potential donor is able to engage in meaningful dialogue:   Education and instruction about all phases of the living donation process including:   Consent  Medical and psychosocial evaluation  Information about the surgical procedure  Pre and post operative care  Benefits of post operative follow up  Disclosure that the [facility] will take all reasonable precautions to provide confidentiality for the donor/recipient  Disclosure that it is a federal crime for any person to knowingly acquire, obtain or otherwise transfer any human organ for valuable consideration  Disclosure that the [facility] must provide an independent living donor advocate (ROSEMARY)  Disclosure that health information obtained during the evaluation is subject to the same regulations as all records and could reveal conditions that must be reported to local, state, or federal public health authorities  Disclosure that the [facility] is required to report living donor follow up information at 6 months, 1 year, and 2 years, and that the potential donor must commit to post operative follow up testing coordinated by the [facility]    Disclosure has been provided that these risks may be  transient or permanent & include but are not limited to:  Potential psychosocial risks:  Problems with body image  Post-surgery depression or anxiety  Feelings of emotional distress or bereavement if recipient experiences any recurrent disease or in the event of the recipient s death  Impact of donation on the donor s lifestyle    Potential financial impacts:  Personal expenses of travel, housing, , lost wages related to donation might not be reimbursed. However, resources may be available to defray some donation-related expenses   Need for life-long follow up at the donor s expense  Loss of employment or income  Negative impact on the ability to obtain future employment  Negative impact on the ability to obtain, maintain, or afford health, disability, and life insurance  Future health problems experienced by living donors following donation may not be covered by the recipient s insurance    Contact Information:  RONN Lu, [facility]  Clinical  and Independent Donor Advocate  [facility]  Pager:  [phone]  Direct:  [phone]    Time Spent: 60 minutes

## 2021-09-10 NOTE — LETTER
9/10/2021         RE: Maura Sanders  [address] [place] [address]        Dear Colleague,    Thank you for referring your patient, Maura Sanders, to the [facility]. Please see a copy of my visit note below.    Iohexol Timed Test Nursing Note    Maura Sanders comes to [facility] today for a Iohexol GFR Timed test.   Orders from  were completed.    Progress Note      The following information was verified with the patient:  *Female patients are not pregnant or could not have become recently pregnant: No  *Is there a history of allergy (skin rash, swelling, ect) to :   a. Iodine (except skin reactions to Betadine): NO   b. Intravenous radio-contrast agents: NO   c. Seafood: NO     RN provided patient with educational handout regarding timed test. YES    Medication administered :   Iohexol (Omnipaque 300 mg Iodine/ ml concentration) 5 mls.  Site administered right AC  Start [id number]  Stop [id number]    Blood draws were drawn by transplant coordinator.          Patient tolerated the procedure well    Vitals were reviewed   /71   Pulse 68   Temp 97.1  F (36.2  C) (Tympanic)   Resp 12   SpO2 98%     Discharge Plan    Discharge instructions reviewed with patient: YES  Discharge papers printed and given to patient: YES  Patient/Representative verbalized understanding, all questions answered: YES        Fela Lawrence RN,       Again, thank you for allowing me to participate in the care of your patient.        Sincerely,        [facility] [address]

## 2021-09-10 NOTE — PROGRESS NOTES
TRANSPLANT NEPHROLOGY DONOR EVALUATION    Assessment and Plan:  # Prospective Kidney Transplant Donor: Patient with one main issue that needs to be addressed prior to donation.     Within the limitations of both estimated and measured GFR assessments, it remains unclear if she has adequate renal reserve for kidney donation. Accounting for underestimation with her best eGFR on 8/31/2021, she is borderline. We will have to see what her measured GFR shows to determine her candidacy for donation and guide subsequent testing.     She otherwise appears to be an excellent candidate with high motivation, overall great health, adequate support, and excellent insight and judgement.      Patient's blood pressure is acceptable at this visit, kidney function appears to be possibly low with Iohexol pending, and urinalysis is bland.    Discussed the risks of donating a kidney, including the surgical risk and the possible risks of living with one kidney.    Education about expected post-donation kidney function and how chronic kidney disease (CKD) and end stage kidney disease (ESKD) might potentially impact the donor in the future, include, but not limited to:       - On average, donors will have 25-35% permanent loss of kidney function at donation.       - Baseline risk of ESKD may slightly exceed that of members of the general population with the same demographic profile.       - Donor risks must be interpreted in light of known epidemiology of both CKD or ESKD, such as that CKD generally develops in midlife (40-50 years old) and ESKD generally develops after age 60.       - Medical evaluation of young potential donors cannot predict lifetime risk of CKD or ESKD.       - Donors may be at higher risk for CKD if they sustain damage to the remaining kidney.       - Development of CKD and progression of ESKD may be more rapid with only 1 kidney. I described her risk for ESRD within the limitations of our current understanding  using Deyanira et al. Melissa SHERMAN, Jase AB, Yunior MC, Blu RA, Dane MA, Mg JL, Carmen DL. Risk of end-stage renal disease following live kidney donation. KING. 2014 Feb 12;311(6):579-98. doi: 10.1001/king.2013.742017. PMID: 64981365; PMCID: [id number].       - Some type of kidney replacement therapy, either kidney transplant or dialysis, is required when reaching ESKD.    Potential medical or surgical risks include, but not limited to:       - Death.       - Scars, pain, fatigue, and other consequences typical of any surgical procedure.       - Decreased kidney function.       - Abdominal or bowel symptoms, such as bloating and nausea, and developing bowel obstruction.       - Kidney failure (ESKD) and the need for a kidney transplant or dialysis for the donor.       - Impact of obesity, hypertension, or other donor-specific medical conditions on morbidity and mortality of the potential donor.    Patients overall evaluation will be discussed with the transplant team in committee and a final recommendation on the patients' suitability to be a kidney transplant donor will be made at that time.    #Mixed hyperlipidemia on fasting labs   -I advised she discuss with her primary care physician about diet, lifestyle changes and possibly medication as deemed appropriate.    Consult:  Maura Sanders was seen in consultation at the request of Dr. Yared Wilson for evaluation as a potential kidney transplant donor.    Reason for Visit:  Maura Sanders is a 61 year old female who presents for a kidney donor evaluation.  Patient would like to donate to her neighbor, but would be interested in non-directed donation/paired exchange were they not a match.     HPI:    Ms. Sanders is a 61 year old female with a history of anemia, vitamin D deficiency, 3 uncomplicated pregnancies with C-sections, appendectomy who is here for a donor evaluation.     Overall Ms. Sanders has been doing well, denying any major health  "concerns today. She is not on any prescription mediations. She takes a Vitamin D supplement, and iron, magnesium supplements. She uses Aleve \"once every 2 months\" for aches, pains. She is physically fit with a BMI of 21.     She has some familiarity with life after solitary kidney due to her  losing his kidney due to renal cell carcinoma.     On review of systems, she denies fevers, chills, chest pain, shortness of breath, nausea, vomiting, abdominal pain, diarrhea, constipation, edema.     ABO: A negative     Kidney Disease Hx:  eGFR range between 58 and 72 per our labs. Iohexol pending.   UA bland, no overt proteinuria          h/o Kidney Problems: No  Family h/o Genetic Kidney Disease: No       h/o HTN: No    Usual BP: high 90s-100s/60s       h/o Protein in Urine: No  h/o Blood in Urine: No       h/o Kidney Stones: No  h/o Kidney Injury: No       h/o Recurrent UTI: No  h/o Genitourinary Problems: No       h/o Chronic NSAID Use: No    Diabetes  Hx:       h/o DM: No    Cardiovascular Hx:   -Hyperlipidemia with elevated LDL, non-HDL.                h/o Gastrointestinal, Pancreas or Liver Problems: No       h/o Lung or Heart Problems: No       h/o Hematologic Problems: No  h/o Bleeding or Clotting Problems: No       h/o Cancer: No       h/o Infection Problems: No       h/o Gestational DM: No      h/o Preeclampsia: No         Skin Cancer Risk:       h/o more than 50 moles: Yes ; has had a couple spots removed. Pre-cancerous, but denies any cancerous lesions.        h/o extensive sun exposure: Yes        h/o melanoma: No       Family h/o melanoma: No          Cancer Risk:        No personal hx of cancer        Last Pap: 5 years ago        Last mammogram: 5 years ago        Last colonoscopy: 9 years ago          Mental Health Assessment:       h/o Depression: No       h/o Psychiatric Illness: No       h/o Suicidal Attempt(s): No          COVID Vaccination:        She received Pfizer vaccine 8/2021    Review Of " Systems:   A comprehensive review of systems was obtained and negative, except as noted in the HPI or PMH.    Past Medical History:   History was taken from the patient as noted below.  Past Medical History:   Diagnosis Date     Anemia      Vitamin D deficiency        Past Social History:   Past Surgical History:   Procedure Laterality Date     APPENDECTOMY N/A 1974     BUNIONECTOMY N/A 2015     C/SECTION, CLASSICAL N/A 1985     C/SECTION, CLASSICAL  1987     C/SECTION, CLASSICAL N/A 1991     Personal or family history of anesthesia problems: No    Family History:   Family History   Problem Relation Age of Onset     Heart Surgery Father      Cerebrovascular Disease Father      Miguel syndrome Sister      No Known Problems Sister      No Known Problems Brother      No Known Problems Brother      Cerebrovascular Disease Maternal Grandmother      Colon Polyps Maternal Grandfather      Breast Cancer Paternal Grandmother      Pancreatic Cancer Paternal Grandfather      No Known Problems Son      No Known Problems Son      Down Syndrome Daughter           Specific Family History in First Degree Relatives:       FH of Kidney Dz: No FH of DM: No       FH of HTN: No  FH of CAD: No       FH of Cancer: Yes   FH of Kidney Cancer: No    Personal History:   Social History     Socioeconomic History     Marital status:      Spouse name: Not on file     Number of children: Not on file     Years of education: Not on file     Highest education level: Not on file   Occupational History     Not on file   Tobacco Use     Smoking status: Never Smoker     Smokeless tobacco: Never Used   Substance and Sexual Activity     Alcohol use: Not Currently     Drug use: Never     Sexual activity: Not on file   Other Topics Concern     Not on file   Social History Narrative     Not on file     Social Determinants of Health     Financial Resource Strain:      Difficulty of Paying Living Expenses:    Food Insecurity:      Worried About Running  Out of Food in the Last Year:      Ran Out of Food in the Last Year:    Transportation Needs:      Lack of Transportation (Medical):      Lack of Transportation (Non-Medical):    Physical Activity:      Days of Exercise per Week:      Minutes of Exercise per Session:    Stress:      Feeling of Stress :    Social Connections:      Frequency of Communication with Friends and Family:      Frequency of Social Gatherings with Friends and Family:      Attends Jain Services:      Active Member of Clubs or Organizations:      Attends Club or Organization Meetings:      Marital Status:    Intimate Partner Violence:      Fear of Current or Ex-Partner:      Emotionally Abused:      Physically Abused:      Sexually Abused:           Specific Social History:  Active. Biking, tennis, walking, pickleball.   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          Health Insurance Status: Yes       Employment Status: Unemployed  Occupation: Previously worked as physical therapy assistant                     Living Arrangements: lives with their spouse and daughter        Social Support: Yes       Presence of increased risk for disease transmission behaviors as defined by PHS guidelines: No tobacco history. Not current alcohol use.         Allergies:  No Known Allergies    Medications:  No current outpatient medications on file.     No current facility-administered medications for this visit.     There are no discontinued medications.      Vitals:  Vital Signs 9/10/2021 9/10/2021 9/10/2021   Systolic 99 95 96   Diastolic 66 69 66   Pulse - - -   Temperature - - -   Respirations - - -   Weight (LB) - - -   Height - - -   BMI (Calculated) - - -   Pain - - -   O2 - - -       Exam:   GENERAL APPEARANCE: alert and no distress. Appears younger than stated age.   HENT: mouth without ulcers or lesions  LYMPHATICS: no cervical or supraclavicular nodes  RESP: lungs clear to auscultation - no rales, rhonchi or  wheezes  CV: regular rhythm, normal rate, no rub, no murmur. 2+ radial and distal pulses.   EDEMA: no LE edema bilaterally  ABDOMEN: soft, nondistended, nontender, bowel sounds normal  MS: extremities normal - no gross deformities noted, no evidence of inflammation in joints, no muscle tenderness  SKIN: no rash    Results:   Labs and imaging were ordered for this visit and reviewed by me.  Recent Results (from the past 336 hour(s))   Albumin Random Urine Quantitative with Creat Ratio    Collection Time: 08/31/21  1:03 PM   Result Value Ref Range    Creatinine Urine mg/dL 11 mg/dL    Albumin Urine mg/L <5 mg/L    Albumin Urine mg/g Cr     Protein  random urine with Creat Ratio    Collection Time: 08/31/21  1:03 PM   Result Value Ref Range    Total Protein Random Urine g/L <0.05 g/L    Total Protein Urine g/gr Creatinine      Creatinine Urine mg/dL 11 mg/dL   UA with Microscopic    Collection Time: 08/31/21  1:03 PM   Result Value Ref Range    Color Urine Straw Colorless, Straw, Light Yellow, Yellow    Appearance Urine Clear Clear    Glucose Urine Negative Negative mg/dL    Bilirubin Urine Negative Negative    Ketones Urine Negative Negative mg/dL    Specific Gravity Urine 1.002 (L) 1.003 - 1.035    Blood Urine Negative Negative    pH Urine 7.0 5.0 - 7.0    Protein Albumin Urine Negative Negative mg/dL    Urobilinogen Urine Normal Normal, 2.0 mg/dL    Nitrite Urine Negative Negative    Leukocyte Esterase Urine Moderate (A) Negative    RBC Urine <1 <=2 /HPF    WBC Urine 2 <=5 /HPF    Squamous Epithelials Urine <1 <=1 /HPF   Creatinine    Collection Time: 08/31/21  1:37 PM   Result Value Ref Range    Creatinine 0.88 0.52 - 1.04 mg/dL    GFR Estimate 72 >60 mL/min/1.73m2   Hemoglobin    Collection Time: 08/31/21  1:37 PM   Result Value Ref Range    Hemoglobin 13.0 11.7 - 15.7 g/dL   ABO and Rh    Collection Time: 08/31/21  1:37 PM   Result Value Ref Range    ABO/RH(D) A NEG     SPECIMEN EXPIRATION DATE [id number]     Asymptomatic COVID-19 Virus (Coronavirus) by PCR Nose    Collection Time: 09/07/21  8:10 AM    Specimen: Nose; Swab   Result Value Ref Range    SARS CoV2 PCR Negative Negative   Routine UA with microscopic    Collection Time: 09/10/21  7:34 AM   Result Value Ref Range    Color Urine Straw Colorless, Straw, Light Yellow, Yellow    Appearance Urine Clear Clear    Glucose Urine Negative Negative mg/dL    Bilirubin Urine Negative Negative    Ketones Urine Negative Negative mg/dL    Specific Gravity Urine 1.011 1.003 - 1.035    Blood Urine Negative Negative    pH Urine 6.0 5.0 - 7.0    Protein Albumin Urine Negative Negative mg/dL    Urobilinogen Urine Normal Normal, 2.0 mg/dL    Nitrite Urine Negative Negative    Leukocyte Esterase Urine Negative Negative    RBC Urine <1 <=2 /HPF    WBC Urine 1 <=5 /HPF   Protein  random urine with Creat Ratio    Collection Time: 09/10/21  7:34 AM   Result Value Ref Range    Total Protein Random Urine g/L <0.05 g/L    Total Protein Urine g/gr Creatinine      Creatinine Urine mg/dL 61 mg/dL   Albumin Random Urine Quantitative with Creat Ratio    Collection Time: 09/10/21  7:34 AM   Result Value Ref Range    Creatinine Urine mg/dL 61 mg/dL    Albumin Urine mg/L <5 mg/L    Albumin Urine mg/g Cr     CBC with platelets    Collection Time: 09/10/21  7:34 AM   Result Value Ref Range    WBC Count 4.1 4.0 - 11.0 10e3/uL    RBC Count 4.20 3.80 - 5.20 10e6/uL    Hemoglobin 12.7 11.7 - 15.7 g/dL    Hematocrit 39.9 35.0 - 47.0 %    MCV 95 78 - 100 fL    MCH 30.2 26.5 - 33.0 pg    MCHC 31.8 31.5 - 36.5 g/dL    RDW 13.1 10.0 - 15.0 %    Platelet Count 281 150 - 450 10e3/uL   Partial thromboplastin time    Collection Time: 09/10/21  7:34 AM   Result Value Ref Range    aPTT 33 22 - 38 Seconds   INR    Collection Time: 09/10/21  7:34 AM   Result Value Ref Range    INR 0.98 0.85 - 1.15   Hemoglobin A1c    Collection Time: 09/10/21  7:34 AM   Result Value Ref Range    Hemoglobin A1C 5.3 0.0 - 5.6 %    Phosphorus    Collection Time: 09/10/21  7:34 AM   Result Value Ref Range    Phosphorus 3.9 2.5 - 4.5 mg/dL   Uric acid    Collection Time: 09/10/21  7:34 AM   Result Value Ref Range    Uric Acid 4.6 2.6 - 6.0 mg/dL   Lipid Profile    Collection Time: 09/10/21  7:34 AM   Result Value Ref Range    Cholesterol 232 (H) <200 mg/dL    Triglycerides 61 <150 mg/dL    Direct Measure HDL 76 >=50 mg/dL    LDL Cholesterol Calculated 144 (H) <=100 mg/dL    Non HDL Cholesterol 156 (H) <130 mg/dL    Patient Fasting > 8hrs? Yes    Comprehensive metabolic panel    Collection Time: 09/10/21  7:34 AM   Result Value Ref Range    Sodium 143 133 - 144 mmol/L    Potassium 4.0 3.4 - 5.3 mmol/L    Chloride 108 94 - 109 mmol/L    Carbon Dioxide (CO2) 27 20 - 32 mmol/L    Anion Gap 8 3 - 14 mmol/L    Urea Nitrogen 17 7 - 30 mg/dL    Creatinine 1.05 (H) 0.52 - 1.04 mg/dL    Calcium 9.0 8.5 - 10.1 mg/dL    Glucose 88 70 - 99 mg/dL    Alkaline Phosphatase 63 40 - 150 U/L    AST 17 0 - 45 U/L    ALT 25 0 - 50 U/L    Protein Total 7.3 6.8 - 8.8 g/dL    Albumin 3.7 3.4 - 5.0 g/dL    Bilirubin Total 0.4 0.2 - 1.3 mg/dL    GFR Estimate 58 (L) >60 mL/min/1.73m2   Bilirubin direct    Collection Time: 09/10/21  7:34 AM   Result Value Ref Range    Bilirubin Direct 0.1 0.0 - 0.2 mg/dL

## 2021-09-10 NOTE — LETTER
9/10/2021         RE: Maura Sanders  [address] [place] [address]        Dear Colleague,    Thank you for referring your patient, Maura Sanders, to the [facility]. Please see a copy of my visit note below.    Transplant Surgery Consult Note    Medical record number: [id number]  YOB: 1960,   Consult requested by the patient for evaluation of kidney donation candidacy.    Assessment and Recommendations: Ms. Sanders appears to be a excellent candidate for kidney donation at this point in the evaluation. The following issues will need to be addressed prior to formal review:  62 yo healthy female    Donating to friend  H/O appendectomy open  3 C sections  Physically active   Blood type A  Is willing to consider paired donation if necessary    Risks of the surgical procedure including but not limited to the rare risk of mortality discussed in detail. Patient verbalized good understanding and had several pertinent questions which were answered satisfactorily.         The majority of our visit today was spent in counseling regarding the medical and surgical risks of kidney donation, the typical santos-and post-operative experience and recovery/return to work pattern.  We also talked about post-op visits and longer term health care maintenance, as well as the implications of having one remaining kidney. This discussion included, but was not limited to rates of complications such as bleeding, infection, need for transfusion, reoperation, other organ injury, future bowel obstruction, incisional hernia, port site pain, varicocele, venous thrombosis, pulmonary embolism, renal failure, and death (3 per 10,000). The patient understands that if end stage renal failure happens that dialysis or transplant would be required.   At the conclusion of the visit, all questions had been answered and Ms. Sanders's candidacy for donation will be reviewed at our Multidisciplinary Donor  Selection Committee. She will stay in contact with the nurse coordinator with any concerns.      Total time: 45 minutes  Counselling time: 30 minutes    .  ---------------------------------------------------------------------------------------------------    HPI: Maura Sanders is a 61 year old year old female who presents for a kidney donor evaluation.  Patient would like to donate to friend.      Personal history of:   No    Yes  Cancer:    []      []             Comment:     Diabetes   []      []  Comment:    Warren   []      []  Comment:       Hepatitis   []      []  Comment:    Tuberculosis   []      []  Comment:   Back or neck pain:  []      []  Comment:      Kidney stones   []      []  Comment:                  Kidney infections  []      []  Comment:           Urinary retention  []      []            Comment:   Regular NSAID use:  []      []            Comment:      Constipation:   []      []            Comment:      Sikhism  []      []            Comment:      Other:    []      []            Comment:         Past Medical History:   Diagnosis Date     Anemia      Vitamin D deficiency      Past Surgical History:   Procedure Laterality Date     APPENDECTOMY N/A 1974     BUNIONECTOMY N/A 2015     C/SECTION, CLASSICAL N/A 1985     C/SECTION, CLASSICAL  1987     C/SECTION, CLASSICAL N/A 1991     Family History   Problem Relation Age of Onset     Heart Surgery Father      Cerebrovascular Disease Father      Miguel syndrome Sister      No Known Problems Sister      No Known Problems Brother      No Known Problems Brother      Cerebrovascular Disease Maternal Grandmother      Colon Polyps Maternal Grandfather      Breast Cancer Paternal Grandmother      Pancreatic Cancer Paternal Grandfather      No Known Problems Son      No Known Problems Son      Down Syndrome Daughter      Social History     Socioeconomic History     Marital status:      Spouse name: Not on file     Number of children: Not on  file     Years of education: Not on file     Highest education level: Not on file   Occupational History     Not on file   Tobacco Use     Smoking status: Never Smoker     Smokeless tobacco: Never Used   Substance and Sexual Activity     Alcohol use: Not Currently     Drug use: Never     Sexual activity: Not on file   Other Topics Concern     Not on file   Social History Narrative     Not on file     Social Determinants of Health     Financial Resource Strain:      Difficulty of Paying Living Expenses:    Food Insecurity:      Worried About Running Out of Food in the Last Year:      Ran Out of Food in the Last Year:    Transportation Needs:      Lack of Transportation (Medical):      Lack of Transportation (Non-Medical):    Physical Activity:      Days of Exercise per Week:      Minutes of Exercise per Session:    Stress:      Feeling of Stress :    Social Connections:      Frequency of Communication with Friends and Family:      Frequency of Social Gatherings with Friends and Family:      Attends Latter-day Services:      Active Member of Clubs or Organizations:      Attends Club or Organization Meetings:      Marital Status:    Intimate Partner Violence:      Fear of Current or Ex-Partner:      Emotionally Abused:      Physically Abused:      Sexually Abused:        ROS:   CONSTITUTIONAL:  No fevers or chills  EYES: negative for icterus  ENT:  negative for hearing loss, tinnitus and sore throat  RESPIRATORY:  negative for cough, sputum, dyspnea  CARDIOVASCULAR:  negative for chest pain  GASTROINTESTINAL:  negative for nausea, vomiting, diarrhea or constipation  GENITOURINARY:  negative for incontinence, dysuria, bladder emptying problems  HEME:  No easy bruising  INTEGUMENT:  negative for rash and pruritus  NEURO:  Negative for headache, seizure disorder    Allergies:   No Known Allergies    Medications:  Clinic-Administered Medications as of 9/10/2021       Dose Frequency Start End    iohexol (OMNIPAQUE 300)  injection 5 mL (Completed) 5 mL ONCE 9/10/2021 9/10/2021    Route: Intravenous          Exam:   Temp:  [97.1  F (36.2  C)] 97.1  F (36.2  C)  Pulse:  [68-77] 77  Resp:  [12] 12  BP: ()/(66-71) 96/66  SpO2:  [98 %-100 %] 100 %  Body mass index is 21.35 kg/m .  Temp:  [97.1  F (36.2  C)] 97.1  F (36.2  C)  Pulse:  [68-77] 77  Resp:  [12] 12  BP: ()/(66-71) 96/66  SpO2:  [98 %-100 %] 100 %  Appearance: in no apparent distress.   Skin: normal  Head and Neck: Normal, no rashes or jaundice  Respiratory: normal respiratory excursions, no audible wheeze  Cardiovascular: RRR  Abdomen: flat, Surgical scars consistent with history   Extremeties: Edema, none  Neuro: without deficit       Diagnostics:   Recent Results (from the past 336 hour(s))   Albumin Random Urine Quantitative with Creat Ratio    Collection Time: 08/31/21  1:03 PM   Result Value Ref Range    Creatinine Urine mg/dL 11 mg/dL    Albumin Urine mg/L <5 mg/L    Albumin Urine mg/g Cr     Protein  random urine with Creat Ratio    Collection Time: 08/31/21  1:03 PM   Result Value Ref Range    Total Protein Random Urine g/L <0.05 g/L    Total Protein Urine g/gr Creatinine      Creatinine Urine mg/dL 11 mg/dL   UA with Microscopic    Collection Time: 08/31/21  1:03 PM   Result Value Ref Range    Color Urine Straw Colorless, Straw, Light Yellow, Yellow    Appearance Urine Clear Clear    Glucose Urine Negative Negative mg/dL    Bilirubin Urine Negative Negative    Ketones Urine Negative Negative mg/dL    Specific Gravity Urine 1.002 (L) 1.003 - 1.035    Blood Urine Negative Negative    pH Urine 7.0 5.0 - 7.0    Protein Albumin Urine Negative Negative mg/dL    Urobilinogen Urine Normal Normal, 2.0 mg/dL    Nitrite Urine Negative Negative    Leukocyte Esterase Urine Moderate (A) Negative    RBC Urine <1 <=2 /HPF    WBC Urine 2 <=5 /HPF    Squamous Epithelials Urine <1 <=1 /HPF   Creatinine    Collection Time: 08/31/21  1:37 PM   Result Value Ref Range     Creatinine 0.88 0.52 - 1.04 mg/dL    GFR Estimate 72 >60 mL/min/1.73m2   Hemoglobin    Collection Time: 08/31/21  1:37 PM   Result Value Ref Range    Hemoglobin 13.0 11.7 - 15.7 g/dL   ABO and Rh    Collection Time: 08/31/21  1:37 PM   Result Value Ref Range    ABO/RH(D) A NEG     SPECIMEN EXPIRATION DATE [id number]    Asymptomatic COVID-19 Virus (Coronavirus) by PCR Nose    Collection Time: 09/07/21  8:10 AM    Specimen: Nose; Swab   Result Value Ref Range    SARS CoV2 PCR Negative Negative   Routine UA with microscopic    Collection Time: 09/10/21  7:34 AM   Result Value Ref Range    Color Urine Straw Colorless, Straw, Light Yellow, Yellow    Appearance Urine Clear Clear    Glucose Urine Negative Negative mg/dL    Bilirubin Urine Negative Negative    Ketones Urine Negative Negative mg/dL    Specific Gravity Urine 1.011 1.003 - 1.035    Blood Urine Negative Negative    pH Urine 6.0 5.0 - 7.0    Protein Albumin Urine Negative Negative mg/dL    Urobilinogen Urine Normal Normal, 2.0 mg/dL    Nitrite Urine Negative Negative    Leukocyte Esterase Urine Negative Negative    RBC Urine <1 <=2 /HPF    WBC Urine 1 <=5 /HPF   Protein  random urine with Creat Ratio    Collection Time: 09/10/21  7:34 AM   Result Value Ref Range    Total Protein Random Urine g/L <0.05 g/L    Total Protein Urine g/gr Creatinine      Creatinine Urine mg/dL 61 mg/dL   Albumin Random Urine Quantitative with Creat Ratio    Collection Time: 09/10/21  7:34 AM   Result Value Ref Range    Creatinine Urine mg/dL 61 mg/dL    Albumin Urine mg/L <5 mg/L    Albumin Urine mg/g Cr     CBC with platelets    Collection Time: 09/10/21  7:34 AM   Result Value Ref Range    WBC Count 4.1 4.0 - 11.0 10e3/uL    RBC Count 4.20 3.80 - 5.20 10e6/uL    Hemoglobin 12.7 11.7 - 15.7 g/dL    Hematocrit 39.9 35.0 - 47.0 %    MCV 95 78 - 100 fL    MCH 30.2 26.5 - 33.0 pg    MCHC 31.8 31.5 - 36.5 g/dL    RDW 13.1 10.0 - 15.0 %    Platelet Count 281 150 - 450 10e3/uL   Partial  thromboplastin time    Collection Time: 09/10/21  7:34 AM   Result Value Ref Range    aPTT 33 22 - 38 Seconds   INR    Collection Time: 09/10/21  7:34 AM   Result Value Ref Range    INR 0.98 0.85 - 1.15   Hemoglobin A1c    Collection Time: 09/10/21  7:34 AM   Result Value Ref Range    Hemoglobin A1C 5.3 0.0 - 5.6 %   Phosphorus    Collection Time: 09/10/21  7:34 AM   Result Value Ref Range    Phosphorus 3.9 2.5 - 4.5 mg/dL   Uric acid    Collection Time: 09/10/21  7:34 AM   Result Value Ref Range    Uric Acid 4.6 2.6 - 6.0 mg/dL   Lipid Profile    Collection Time: 09/10/21  7:34 AM   Result Value Ref Range    Cholesterol 232 (H) <200 mg/dL    Triglycerides 61 <150 mg/dL    Direct Measure HDL 76 >=50 mg/dL    LDL Cholesterol Calculated 144 (H) <=100 mg/dL    Non HDL Cholesterol 156 (H) <130 mg/dL    Patient Fasting > 8hrs? Yes    Comprehensive metabolic panel    Collection Time: 09/10/21  7:34 AM   Result Value Ref Range    Sodium 143 133 - 144 mmol/L    Potassium 4.0 3.4 - 5.3 mmol/L    Chloride 108 94 - 109 mmol/L    Carbon Dioxide (CO2) 27 20 - 32 mmol/L    Anion Gap 8 3 - 14 mmol/L    Urea Nitrogen 17 7 - 30 mg/dL    Creatinine 1.05 (H) 0.52 - 1.04 mg/dL    Calcium 9.0 8.5 - 10.1 mg/dL    Glucose 88 70 - 99 mg/dL    Alkaline Phosphatase 63 40 - 150 U/L    AST 17 0 - 45 U/L    ALT 25 0 - 50 U/L    Protein Total 7.3 6.8 - 8.8 g/dL    Albumin 3.7 3.4 - 5.0 g/dL    Bilirubin Total 0.4 0.2 - 1.3 mg/dL    GFR Estimate 58 (L) >60 mL/min/1.73m2   Bilirubin direct    Collection Time: 09/10/21  7:34 AM   Result Value Ref Range    Bilirubin Direct 0.1 0.0 - 0.2 mg/dL           Again, thank you for allowing me to participate in the care of your patient.        Sincerely,        Yared Wilson MD

## 2021-09-10 NOTE — PROGRESS NOTES
Iohexol Timed Test Nursing Note    Maura Sanders comes to [facility] today for a Iohexol GFR Timed test.   Orders from  were completed.    Progress Note      The following information was verified with the patient:  *Female patients are not pregnant or could not have become recently pregnant: No  *Is there a history of allergy (skin rash, swelling, ect) to :   a. Iodine (except skin reactions to Betadine): NO   b. Intravenous radio-contrast agents: NO   c. Seafood: NO     RN provided patient with educational handout regarding timed test. YES    Medication administered :   Iohexol (Omnipaque 300 mg Iodine/ ml concentration) 5 mls.  Site administered right AC  Start [id number]  Stop [id number]    Blood draws were drawn by transplant coordinator.          Patient tolerated the procedure well    Vitals were reviewed   /71   Pulse 68   Temp 97.1  F (36.2  C) (Tympanic)   Resp 12   SpO2 98%     Discharge Plan    Discharge instructions reviewed with patient: YES  Discharge papers printed and given to patient: YES  Patient/Representative verbalized understanding, all questions answered: YES        Fela Lawrence RN,

## 2021-09-10 NOTE — NURSING NOTE
Chief Complaint   Patient presents with     Blood Draw     Labs drawn via PIV by RN in lab. VS taken.      Labs drawn via peripheral IV. Vital signs taken. Checked into next appointment.   Sharon Jackson RN

## 2021-09-10 NOTE — PROGRESS NOTES
[facility]  Outpatient MNT: Kidney Donor Evaluation    Current BMI: 21.3 (HT 65 in,  lbs/58 kg)  BMI is within recommendation of <30 for kidney donation    8 Year Estimated Risk of T2DM  </= 3%     Time Spent: 15 minutes  Visit Type: Initial   Referring Physician: Steve   Pt accompanied by: self    Nutrition Assessment  Vitamins, Supplements, Pertinent Meds: iron with vit C, MVI, mag citrate, occasional vit D   Herbal Medicines/Supplements: essentials oils- topical     Weight hx: stable     Food Security: any concerns about having enough money to buy food or access to grocery stores? No     Diet Recall  Breakfast None    Lunch Turkey s/w, salad, stir johnson with chicken, hamburger, steak, soup    Dinner Similar foods to L   Snacks Protein bar, fruit   Beverages 1/2 can diet soda, water/sparkling water, variety of tea    Alcohol None    Dining out 2x/week      Physical Activity  4-5x/week- yoga, walking, biking, tennis      Labs  Chol 232 TG 61 HDL 76     FBG = 88  A1c = 5.3  BP = wnl    Prediction of Incident Diabetes Mellitus in Middle-aged Adults: The [place] Offspring Study  Ethan Sanchez MD; James B. Meigs, MD, MPH; Oralia Valente, PhD; Luna Heath MD, MPH; Hussain Flowers MD; Juan Zhang Sr,   PhD  Pt's estimated risk for T2DM (per Table 6 above)  Pt received points for the following criteria: none   Total points: 0  8-Year estimated risk of T2DM: </= 3%    Nutrition Diagnosis  No nutrition diagnosis identified at this time.    Nutrition Intervention  Nutrition education provided:  Reviewed overall healthy diet guidelines for pre and post kidney donation. Discussed maintenance of a healthy weight and Na+ intake <3000 mg/day (<2000 mg/day if HTN).    Avoid the following post op d/t unknown effects on the organs:  - Herbal, Chinese, holistic, chiropractic, natural, alternative medicines and supplements  - Detoxes and cleanses  - Weight loss  pills  - Protein powders or other products with extracts or herbs (ie green tea extract)    Patient Understanding: Pt verbalized understanding of education provided.  Expected Engagement: Good  Follow-Up Plans: PRN     Nutrition Goals  No nutrition goals identified at this time     Beckie De Santiago, RD, LD, CCTD

## 2021-09-10 NOTE — PROGRESS NOTES
Transplant Surgery Consult Note    Medical record number: [id number]  YOB: 1960,   Consult requested by the patient for evaluation of kidney donation candidacy.    Assessment and Recommendations: Ms. Sanders appears to be a excellent candidate for kidney donation at this point in the evaluation. The following issues will need to be addressed prior to formal review:  62 yo healthy female    Donating to friend  H/O appendectomy open  3 C sections  Physically active   Blood type A  Is willing to consider paired donation if necessary    Risks of the surgical procedure including but not limited to the rare risk of mortality discussed in detail. Patient verbalized good understanding and had several pertinent questions which were answered satisfactorily.         The majority of our visit today was spent in counseling regarding the medical and surgical risks of kidney donation, the typical santos-and post-operative experience and recovery/return to work pattern.  We also talked about post-op visits and longer term health care maintenance, as well as the implications of having one remaining kidney. This discussion included, but was not limited to rates of complications such as bleeding, infection, need for transfusion, reoperation, other organ injury, future bowel obstruction, incisional hernia, port site pain, varicocele, venous thrombosis, pulmonary embolism, renal failure, and death (3 per 10,000). The patient understands that if end stage renal failure happens that dialysis or transplant would be required.   At the conclusion of the visit, all questions had been answered and Ms. Sanders's candidacy for donation will be reviewed at our Multidisciplinary Donor Selection Committee. She will stay in contact with the nurse coordinator with any concerns.      Total time: 45 minutes  Counselling time: 30  minutes    .  ---------------------------------------------------------------------------------------------------    HPI: Maura Sanders is a 61 year old year old female who presents for a kidney donor evaluation.  Patient would like to donate to friend.      Personal history of:   No    Yes  Cancer:    []      []             Comment:     Diabetes   []      []  Comment:    Warren   []      []  Comment:       Hepatitis   []      []  Comment:    Tuberculosis   []      []  Comment:   Back or neck pain:  []      []  Comment:      Kidney stones   []      []  Comment:                  Kidney infections  []      []  Comment:           Urinary retention  []      []            Comment:   Regular NSAID use:  []      []            Comment:      Constipation:   []      []            Comment:      Mu-ism  []      []            Comment:      Other:    []      []            Comment:         Past Medical History:   Diagnosis Date     Anemia      Vitamin D deficiency      Past Surgical History:   Procedure Laterality Date     APPENDECTOMY N/A 1974     BUNIONECTOMY N/A 2015     C/SECTION, CLASSICAL N/A 1985     C/SECTION, CLASSICAL  1987     C/SECTION, CLASSICAL N/A 1991     Family History   Problem Relation Age of Onset     Heart Surgery Father      Cerebrovascular Disease Father      Miguel syndrome Sister      No Known Problems Sister      No Known Problems Brother      No Known Problems Brother      Cerebrovascular Disease Maternal Grandmother      Colon Polyps Maternal Grandfather      Breast Cancer Paternal Grandmother      Pancreatic Cancer Paternal Grandfather      No Known Problems Son      No Known Problems Son      Down Syndrome Daughter      Social History     Socioeconomic History     Marital status:      Spouse name: Not on file     Number of children: Not on file     Years of education: Not on file     Highest education level: Not on file   Occupational History     Not on file   Tobacco Use      Smoking status: Never Smoker     Smokeless tobacco: Never Used   Substance and Sexual Activity     Alcohol use: Not Currently     Drug use: Never     Sexual activity: Not on file   Other Topics Concern     Not on file   Social History Narrative     Not on file     Social Determinants of Health     Financial Resource Strain:      Difficulty of Paying Living Expenses:    Food Insecurity:      Worried About Running Out of Food in the Last Year:      Ran Out of Food in the Last Year:    Transportation Needs:      Lack of Transportation (Medical):      Lack of Transportation (Non-Medical):    Physical Activity:      Days of Exercise per Week:      Minutes of Exercise per Session:    Stress:      Feeling of Stress :    Social Connections:      Frequency of Communication with Friends and Family:      Frequency of Social Gatherings with Friends and Family:      Attends Yazdanism Services:      Active Member of Clubs or Organizations:      Attends Club or Organization Meetings:      Marital Status:    Intimate Partner Violence:      Fear of Current or Ex-Partner:      Emotionally Abused:      Physically Abused:      Sexually Abused:        ROS:   CONSTITUTIONAL:  No fevers or chills  EYES: negative for icterus  ENT:  negative for hearing loss, tinnitus and sore throat  RESPIRATORY:  negative for cough, sputum, dyspnea  CARDIOVASCULAR:  negative for chest pain  GASTROINTESTINAL:  negative for nausea, vomiting, diarrhea or constipation  GENITOURINARY:  negative for incontinence, dysuria, bladder emptying problems  HEME:  No easy bruising  INTEGUMENT:  negative for rash and pruritus  NEURO:  Negative for headache, seizure disorder    Allergies:   No Known Allergies    Medications:  Clinic-Administered Medications as of 9/10/2021       Dose Frequency Start End    iohexol (OMNIPAQUE 300) injection 5 mL (Completed) 5 mL ONCE 9/10/2021 9/10/2021    Route: Intravenous          Exam:   Temp:  [97.1  F (36.2  C)] 97.1  F (36.2   C)  Pulse:  [68-77] 77  Resp:  [12] 12  BP: ()/(66-71) 96/66  SpO2:  [98 %-100 %] 100 %  Body mass index is 21.35 kg/m .  Temp:  [97.1  F (36.2  C)] 97.1  F (36.2  C)  Pulse:  [68-77] 77  Resp:  [12] 12  BP: ()/(66-71) 96/66  SpO2:  [98 %-100 %] 100 %  Appearance: in no apparent distress.   Skin: normal  Head and Neck: Normal, no rashes or jaundice  Respiratory: normal respiratory excursions, no audible wheeze  Cardiovascular: RRR  Abdomen: flat, Surgical scars consistent with history   Extremeties: Edema, none  Neuro: without deficit       Diagnostics:   Recent Results (from the past 336 hour(s))   Albumin Random Urine Quantitative with Creat Ratio    Collection Time: 08/31/21  1:03 PM   Result Value Ref Range    Creatinine Urine mg/dL 11 mg/dL    Albumin Urine mg/L <5 mg/L    Albumin Urine mg/g Cr     Protein  random urine with Creat Ratio    Collection Time: 08/31/21  1:03 PM   Result Value Ref Range    Total Protein Random Urine g/L <0.05 g/L    Total Protein Urine g/gr Creatinine      Creatinine Urine mg/dL 11 mg/dL   UA with Microscopic    Collection Time: 08/31/21  1:03 PM   Result Value Ref Range    Color Urine Straw Colorless, Straw, Light Yellow, Yellow    Appearance Urine Clear Clear    Glucose Urine Negative Negative mg/dL    Bilirubin Urine Negative Negative    Ketones Urine Negative Negative mg/dL    Specific Gravity Urine 1.002 (L) 1.003 - 1.035    Blood Urine Negative Negative    pH Urine 7.0 5.0 - 7.0    Protein Albumin Urine Negative Negative mg/dL    Urobilinogen Urine Normal Normal, 2.0 mg/dL    Nitrite Urine Negative Negative    Leukocyte Esterase Urine Moderate (A) Negative    RBC Urine <1 <=2 /HPF    WBC Urine 2 <=5 /HPF    Squamous Epithelials Urine <1 <=1 /HPF   Creatinine    Collection Time: 08/31/21  1:37 PM   Result Value Ref Range    Creatinine 0.88 0.52 - 1.04 mg/dL    GFR Estimate 72 >60 mL/min/1.73m2   Hemoglobin    Collection Time: 08/31/21  1:37 PM   Result Value Ref  Range    Hemoglobin 13.0 11.7 - 15.7 g/dL   ABO and Rh    Collection Time: 08/31/21  1:37 PM   Result Value Ref Range    ABO/RH(D) A NEG     SPECIMEN EXPIRATION DATE [id number]    Asymptomatic COVID-19 Virus (Coronavirus) by PCR Nose    Collection Time: 09/07/21  8:10 AM    Specimen: Nose; Swab   Result Value Ref Range    SARS CoV2 PCR Negative Negative   Routine UA with microscopic    Collection Time: 09/10/21  7:34 AM   Result Value Ref Range    Color Urine Straw Colorless, Straw, Light Yellow, Yellow    Appearance Urine Clear Clear    Glucose Urine Negative Negative mg/dL    Bilirubin Urine Negative Negative    Ketones Urine Negative Negative mg/dL    Specific Gravity Urine 1.011 1.003 - 1.035    Blood Urine Negative Negative    pH Urine 6.0 5.0 - 7.0    Protein Albumin Urine Negative Negative mg/dL    Urobilinogen Urine Normal Normal, 2.0 mg/dL    Nitrite Urine Negative Negative    Leukocyte Esterase Urine Negative Negative    RBC Urine <1 <=2 /HPF    WBC Urine 1 <=5 /HPF   Protein  random urine with Creat Ratio    Collection Time: 09/10/21  7:34 AM   Result Value Ref Range    Total Protein Random Urine g/L <0.05 g/L    Total Protein Urine g/gr Creatinine      Creatinine Urine mg/dL 61 mg/dL   Albumin Random Urine Quantitative with Creat Ratio    Collection Time: 09/10/21  7:34 AM   Result Value Ref Range    Creatinine Urine mg/dL 61 mg/dL    Albumin Urine mg/L <5 mg/L    Albumin Urine mg/g Cr     CBC with platelets    Collection Time: 09/10/21  7:34 AM   Result Value Ref Range    WBC Count 4.1 4.0 - 11.0 10e3/uL    RBC Count 4.20 3.80 - 5.20 10e6/uL    Hemoglobin 12.7 11.7 - 15.7 g/dL    Hematocrit 39.9 35.0 - 47.0 %    MCV 95 78 - 100 fL    MCH 30.2 26.5 - 33.0 pg    MCHC 31.8 31.5 - 36.5 g/dL    RDW 13.1 10.0 - 15.0 %    Platelet Count 281 150 - 450 10e3/uL   Partial thromboplastin time    Collection Time: 09/10/21  7:34 AM   Result Value Ref Range    aPTT 33 22 - 38 Seconds   INR    Collection Time:  09/10/21  7:34 AM   Result Value Ref Range    INR 0.98 0.85 - 1.15   Hemoglobin A1c    Collection Time: 09/10/21  7:34 AM   Result Value Ref Range    Hemoglobin A1C 5.3 0.0 - 5.6 %   Phosphorus    Collection Time: 09/10/21  7:34 AM   Result Value Ref Range    Phosphorus 3.9 2.5 - 4.5 mg/dL   Uric acid    Collection Time: 09/10/21  7:34 AM   Result Value Ref Range    Uric Acid 4.6 2.6 - 6.0 mg/dL   Lipid Profile    Collection Time: 09/10/21  7:34 AM   Result Value Ref Range    Cholesterol 232 (H) <200 mg/dL    Triglycerides 61 <150 mg/dL    Direct Measure HDL 76 >=50 mg/dL    LDL Cholesterol Calculated 144 (H) <=100 mg/dL    Non HDL Cholesterol 156 (H) <130 mg/dL    Patient Fasting > 8hrs? Yes    Comprehensive metabolic panel    Collection Time: 09/10/21  7:34 AM   Result Value Ref Range    Sodium 143 133 - 144 mmol/L    Potassium 4.0 3.4 - 5.3 mmol/L    Chloride 108 94 - 109 mmol/L    Carbon Dioxide (CO2) 27 20 - 32 mmol/L    Anion Gap 8 3 - 14 mmol/L    Urea Nitrogen 17 7 - 30 mg/dL    Creatinine 1.05 (H) 0.52 - 1.04 mg/dL    Calcium 9.0 8.5 - 10.1 mg/dL    Glucose 88 70 - 99 mg/dL    Alkaline Phosphatase 63 40 - 150 U/L    AST 17 0 - 45 U/L    ALT 25 0 - 50 U/L    Protein Total 7.3 6.8 - 8.8 g/dL    Albumin 3.7 3.4 - 5.0 g/dL    Bilirubin Total 0.4 0.2 - 1.3 mg/dL    GFR Estimate 58 (L) >60 mL/min/1.73m2   Bilirubin direct    Collection Time: 09/10/21  7:34 AM   Result Value Ref Range    Bilirubin Direct 0.1 0.0 - 0.2 mg/dL

## 2021-09-12 LAB
GAMMA INTERFERON BACKGROUND BLD IA-ACNC: 0.05 IU/ML
M TB IFN-G BLD-IMP: NEGATIVE
M TB IFN-G CD4+ BCKGRND COR BLD-ACNC: 9.95 IU/ML
MITOGEN IGNF BCKGRD COR BLD-ACNC: 0.01 IU/ML
MITOGEN IGNF BCKGRD COR BLD-ACNC: 0.02 IU/ML
QUANTIFERON MITOGEN: 10 IU/ML
QUANTIFERON NIL TUBE: 0.05 IU/ML
QUANTIFERON TB1 TUBE: 0.06 IU/ML
QUANTIFERON TB2 TUBE: 0.07

## 2021-09-14 LAB
BSA: 1.63 M2
IOHEXOL CL UR+SERPL-VRATE: 55 ML/MIN
IOHEXOL CL UR+SERPL-VRATE: 59 /1.73 M2
IOHEXOL CL UR+SERPL-VRATE: 6.53 MG/DL
IOHEXOL CL UR+SERPL-VRATE: 8.21 MG/DL

## 2021-09-15 ENCOUNTER — TELEPHONE (OUTPATIENT)
Dept: TRANSPLANT | Facility: CLINIC | Age: 61
End: 2021-09-15

## 2021-09-15 ENCOUNTER — COMMITTEE REVIEW (OUTPATIENT)
Dept: TRANSPLANT | Facility: CLINIC | Age: 61
End: 2021-09-15

## 2021-09-15 NOTE — COMMITTEE REVIEW
Living Donor Committee Review Note Evaluation Date:   Committee Review Date: 9/15/2021    Donor being evaluated for: Kidney    Transplant Phase: Referral  Transplant Status: Active    Transplant Coordinator: Crystal Egan  Transplant Surgeon:       Committee Review Members:  Immunology Hussain Stiles, PhD   Nephrology Chandan Cm MD, Alexia Medley MD, Marlo Potter MD   Nutrition Beckie De Santiago, LAURA   Pharmacist Chris Parham, [facility]    - Clinical Seble Egan, [facility]   Transplant Imelda Lidya Grissom, RN, Suma Cadena, RN, Sharon Tinsley, NP, Lg Lopez, KATELYNN, Kaylee Moffett, ASTRIDN, Yahaira Hicks, KATELYNN, Nivia Ravi, KATELYNN, Crystal Egan, RN   Transplant Surgery Yared Wilson MD, Shahrzad Schroeder MD, MD       Transplant Eligibility: Acceptable Physical Health, Acceptable Mental Health    Committee Review Decision: Declined    Relative Contraindications: None    Absolute Contraindications: None    Committee Chair Alexia Medley MD verbally attested to the committee's decision.    Committee Discussion Details:   1. eGFR on eval was 58 and creat was 1.05.  2. Iohexol level was 55  Unfortunately, Maura will have to be declined because of her kidney function/iohexol results.

## 2021-09-15 NOTE — TELEPHONE ENCOUNTER
Spoke to Maura about committee review results and unfortunately her GFR and iohexol were low and she is unable to be a donor.  I did inform her to continue seeing her primary doctor annually and make sure they continue to check labs as well. Maura understood and appreciated everything we had done and all the education/information we gave her.

## 2021-10-16 ENCOUNTER — HEALTH MAINTENANCE LETTER (OUTPATIENT)
Age: 61
End: 2021-10-16

## 2022-10-01 ENCOUNTER — HEALTH MAINTENANCE LETTER (OUTPATIENT)
Age: 62
End: 2022-10-01

## 2023-10-15 ENCOUNTER — HEALTH MAINTENANCE LETTER (OUTPATIENT)
Age: 63
End: 2023-10-15

## 2023-12-24 ENCOUNTER — HEALTH MAINTENANCE LETTER (OUTPATIENT)
Age: 63
End: 2023-12-24